# Patient Record
(demographics unavailable — no encounter records)

---

## 2025-07-28 NOTE — HISTORY OF PRESENT ILLNESS
[TextBox_4] : Menses q 3-5 weeks, past 6 months has hot flashes, night sweats, feels itchy, weight gain. Does spin class weights 150min/week, on On contrave now for weight loss, not helping. had consult with [facility] for HRT, sent for clearance by hem due to hx of anticardiolipin ab's. She states she is going to be rx'd estrogen patch and prometrium.  [Mammogramdate] : 8/2024 [PapSmeardate] : 7/2023 [No] : Patient does not have concerns regarding sex [Currently Active] : currently active [Men] : men [Vaginal] : vaginal [FreeTextEntry3] : vasectomy

## 2025-07-28 NOTE — PHYSICAL EXAM
[Appropriately responsive] : appropriately responsive [Alert] : alert [Soft] : soft [Non-tender] : non-tender [Examination Of The Breasts] : a normal appearance [No Masses] : no breast masses were palpable [Labia Majora] : normal [Labia Minora] : normal [Normal] : normal [Tenderness] : nontender [Enlarged ___ wks] : not enlarged [Mass ___ cm] : no uterine mass was palpated [Uterine Adnexae] : non-palpable [FreeTextEntry5] : pap done

## 2025-07-28 NOTE — HISTORY OF PRESENT ILLNESS
[TextBox_4] : Menses q 3-5 weeks, past 6 months has hot flashes, night sweats, feels itchy, weight gain. Does spin class weights 150min/week, on On contrave now for weight loss, not helping. had consult with [facility] for HRT, sent for clearance by hem due to hx of anticardiolipin ab's. She states she is going to be rx'd estrogen patch and prometrium.  [Mammogramdate] : 8/2024 [PapSmeardate] : 7/2023 [No] : Patient does not have concerns regarding sex [Currently Active] : currently active [Men] : men [Vaginal] : vaginal [FreeTextEntry3] : vasectomy